# Patient Record
Sex: MALE | Race: WHITE | URBAN - METROPOLITAN AREA
[De-identification: names, ages, dates, MRNs, and addresses within clinical notes are randomized per-mention and may not be internally consistent; named-entity substitution may affect disease eponyms.]

---

## 2023-05-09 ENCOUNTER — OFFICE (OUTPATIENT)
Dept: URBAN - METROPOLITAN AREA CLINIC 102 | Facility: CLINIC | Age: 63
Setting detail: OPHTHALMOLOGY
End: 2023-05-09
Payer: COMMERCIAL

## 2023-05-09 DIAGNOSIS — H25.13: ICD-10-CM

## 2023-05-09 DIAGNOSIS — H43.813: ICD-10-CM

## 2023-05-09 PROCEDURE — 92014 COMPRE OPH EXAM EST PT 1/>: CPT | Performed by: OPHTHALMOLOGY

## 2023-05-09 ASSESSMENT — TONOMETRY
OS_IOP_MMHG: 18
OD_IOP_MMHG: 17

## 2023-05-09 ASSESSMENT — REFRACTION_MANIFEST
OS_AXIS: 090
OD_AXIS: 095
OD_VA1: 20/20
OS_ADD: +2.25
OD_SPHERE: -1.75
OD_ADD: +2.25
OS_SPHERE: -2.50
OD_CYLINDER: -1.00
OS_CYLINDER: -0.75
OS_VA1: 20/20

## 2023-05-09 ASSESSMENT — REFRACTION_CURRENTRX
OD_AXIS: 095
OD_ADD: +2.50
OS_VPRISM_DIRECTION: PROGS
OS_SPHERE: -2.75
OD_AXIS: 095
OD_VPRISM_DIRECTION: PROGS
OS_SPHERE: -2.50
OD_CYLINDER: -1.00
OD_CYLINDER: -0.50
OD_VPRISM_DIRECTION: PROGS
OS_OVR_VA: 20/
OS_OVR_VA: 20/
OS_CYLINDER: -0.75
OD_SPHERE: -1.75
OD_OVR_VA: 20/
OD_SPHERE: -2.25
OS_OVR_VA: 20/
OD_OVR_VA: 20/
OD_ADD: +2.50
OS_AXIS: 086
OS_AXIS: 090
OS_CYLINDER: -0.75
OS_AXIS: 090
OS_ADD: +2.50
OD_AXIS: 090
OS_CYLINDER: -0.50
OD_OVR_VA: 20/
OS_VPRISM_DIRECTION: PROGS
OD_ADD: +2.25
OD_CYLINDER: -0.75
OS_ADD: +2.25
OS_ADD: +2.50
OD_SPHERE: -2.00
OS_SPHERE: -2.50

## 2023-05-09 ASSESSMENT — KERATOMETRY
OS_AXISANGLE_DEGREES: 019
OS_K2POWER_DIOPTERS: 44.00
METHOD_AUTO_MANUAL: AUTO
OD_K2POWER_DIOPTERS: 43.75
OD_AXISANGLE_DEGREES: 164
OD_K1POWER_DIOPTERS: 42.50
OS_K1POWER_DIOPTERS: 43.25

## 2023-05-09 ASSESSMENT — VISUAL ACUITY
OD_BCVA: 20/20
OS_BCVA: 20/25

## 2023-05-09 ASSESSMENT — REFRACTION_AUTOREFRACTION
OD_CYLINDER: -1.25
OS_AXIS: 081
OS_CYLINDER: -0.50
OS_SPHERE: -2.25
OD_SPHERE: --1.25
OD_AXIS: 089

## 2023-05-09 ASSESSMENT — SPHEQUIV_DERIVED
OS_SPHEQUIV: -2.5
OS_SPHEQUIV: -2.875
OD_SPHEQUIV: -2.25

## 2023-05-09 ASSESSMENT — CONFRONTATIONAL VISUAL FIELD TEST (CVF)
OS_FINDINGS: FULL
OD_FINDINGS: FULL

## 2023-05-09 ASSESSMENT — AXIALLENGTH_DERIVED
OD_AL: 24.6518
OS_AL: 24.558
OS_AL: 24.7173

## 2023-07-07 PROBLEM — Z00.00 ENCOUNTER FOR PREVENTIVE HEALTH EXAMINATION: Status: ACTIVE | Noted: 2023-07-07

## 2023-07-18 ENCOUNTER — APPOINTMENT (OUTPATIENT)
Dept: DERMATOLOGY | Facility: CLINIC | Age: 63
End: 2023-07-18

## 2023-09-25 ENCOUNTER — APPOINTMENT (OUTPATIENT)
Dept: UROLOGY | Facility: CLINIC | Age: 63
End: 2023-09-25

## 2023-10-10 ENCOUNTER — APPOINTMENT (OUTPATIENT)
Dept: OTOLARYNGOLOGY | Facility: CLINIC | Age: 63
End: 2023-10-10
Payer: COMMERCIAL

## 2023-10-10 VITALS
DIASTOLIC BLOOD PRESSURE: 79 MMHG | HEIGHT: 72 IN | BODY MASS INDEX: 25.19 KG/M2 | SYSTOLIC BLOOD PRESSURE: 123 MMHG | OXYGEN SATURATION: 95 % | WEIGHT: 186 LBS | HEART RATE: 63 BPM

## 2023-10-10 DIAGNOSIS — H93.8X3 OTHER SPECIFIED DISORDERS OF EAR, BILATERAL: ICD-10-CM

## 2023-10-10 PROCEDURE — 99204 OFFICE O/P NEW MOD 45 MIN: CPT | Mod: 25

## 2023-10-10 PROCEDURE — 31231 NASAL ENDOSCOPY DX: CPT

## 2023-10-10 RX ORDER — LORATADINE 5 MG/5 ML
0.05 SOLUTION, ORAL ORAL
Qty: 1 | Refills: 0 | Status: ACTIVE | COMMUNITY
Start: 2023-10-10 | End: 1900-01-01

## 2023-10-10 RX ORDER — METHYLPREDNISOLONE 4 MG/1
4 TABLET ORAL
Qty: 1 | Refills: 0 | Status: ACTIVE | COMMUNITY
Start: 2023-10-10 | End: 1900-01-01

## 2023-10-10 RX ORDER — AMOXICILLIN AND CLAVULANATE POTASSIUM 875; 125 MG/1; MG/1
875-125 TABLET, COATED ORAL
Qty: 20 | Refills: 0 | Status: ACTIVE | COMMUNITY
Start: 2023-10-10 | End: 1900-01-01

## 2023-10-12 ENCOUNTER — APPOINTMENT (OUTPATIENT)
Dept: INTERNAL MEDICINE | Facility: CLINIC | Age: 63
End: 2023-10-12

## 2023-11-16 ENCOUNTER — APPOINTMENT (OUTPATIENT)
Dept: OTOLARYNGOLOGY | Facility: CLINIC | Age: 63
End: 2023-11-16
Payer: COMMERCIAL

## 2023-11-16 VITALS
SYSTOLIC BLOOD PRESSURE: 115 MMHG | HEIGHT: 72 IN | HEART RATE: 65 BPM | BODY MASS INDEX: 25.19 KG/M2 | WEIGHT: 186 LBS | DIASTOLIC BLOOD PRESSURE: 68 MMHG | TEMPERATURE: 96.6 F

## 2023-11-16 DIAGNOSIS — K21.9 GASTRO-ESOPHAGEAL REFLUX DISEASE W/OUT ESOPHAGITIS: ICD-10-CM

## 2023-11-16 PROCEDURE — 31231 NASAL ENDOSCOPY DX: CPT

## 2023-11-16 PROCEDURE — 99214 OFFICE O/P EST MOD 30 MIN: CPT | Mod: 25

## 2024-01-26 ENCOUNTER — APPOINTMENT (OUTPATIENT)
Dept: CT IMAGING | Facility: CLINIC | Age: 64
End: 2024-01-26
Payer: COMMERCIAL

## 2024-01-26 ENCOUNTER — OUTPATIENT (OUTPATIENT)
Dept: OUTPATIENT SERVICES | Facility: HOSPITAL | Age: 64
LOS: 1 days | End: 2024-01-26
Payer: COMMERCIAL

## 2024-01-26 DIAGNOSIS — R09.81 NASAL CONGESTION: ICD-10-CM

## 2024-01-26 PROCEDURE — 70486 CT MAXILLOFACIAL W/O DYE: CPT | Mod: 26

## 2024-01-26 PROCEDURE — 70486 CT MAXILLOFACIAL W/O DYE: CPT

## 2024-01-29 ENCOUNTER — NON-APPOINTMENT (OUTPATIENT)
Age: 64
End: 2024-01-29

## 2024-02-05 ENCOUNTER — NON-APPOINTMENT (OUTPATIENT)
Age: 64
End: 2024-02-05

## 2024-02-12 ENCOUNTER — APPOINTMENT (OUTPATIENT)
Dept: OTOLARYNGOLOGY | Facility: CLINIC | Age: 64
End: 2024-02-12
Payer: COMMERCIAL

## 2024-02-12 VITALS
DIASTOLIC BLOOD PRESSURE: 75 MMHG | BODY MASS INDEX: 25.19 KG/M2 | HEART RATE: 68 BPM | HEIGHT: 72 IN | SYSTOLIC BLOOD PRESSURE: 125 MMHG | WEIGHT: 186 LBS

## 2024-02-12 DIAGNOSIS — J34.89 OTHER SPECIFIED DISORDERS OF NOSE AND NASAL SINUSES: ICD-10-CM

## 2024-02-12 DIAGNOSIS — R09.81 NASAL CONGESTION: ICD-10-CM

## 2024-02-12 DIAGNOSIS — J32.4 CHRONIC PANSINUSITIS: ICD-10-CM

## 2024-02-12 PROCEDURE — 99214 OFFICE O/P EST MOD 30 MIN: CPT | Mod: 25

## 2024-02-12 PROCEDURE — 31231 NASAL ENDOSCOPY DX: CPT

## 2024-02-12 NOTE — PROCEDURE
[FreeTextEntry6] : Procedure performed: Nasal Endoscopy- Diagnostic Pre-op indication(s): nasal congestion Post-op indication(s): nasal congestion Verbal and/or written consent obtained from patient Anterior rhinoscopy insufficient to account for symptoms Scope #: 3, flexible fiber optic telescope The scope was introduced in the nasal passage between the middle and inferior turbinates to exam the inferior portion of the middle meatus and the fontanelle, as well as the maxillary ostia. Next, the scope was passed medically and posteriorly to the middle turbinates to examine the sphenoethmoid recess and the superior turbinate region.  Upon visualization the finders are as follows: Nasal Septum: sigmoidal septal deviation Bilateral - Mucosa: boggy turbinates, Mucous: scant, Polyp: not seen, Inferior Turbinate: boggy, Middle Turbinate: normal, Superior Turbinate: normal, Inferior Meatus: narrow, Middle Meatus: narrow, Super Meatus: normal, Sphenoethmoidal Recess: clear BITH [de-identified] : Procedure performed: laryngeal Endoscopy- Diagnostic Pre-op/post op indication: dysphonia Verbal and/or written consent obtained from patient, Patient was unable to cooperate with mirror Scope #: 3, flexible fiber optic telescope used  Scope was introduced through the nose passed on the floor of the nose to the nasopharynx and then followed down the soft palate to the lower pharynx. The tongue Base, Larynx, Hypopharynx were examined. Base of tongue was symmetric, vallecular was clear, epiglottis was not deformed, subglottis/ pyriform and posterior pharyngeal walls were clear. No erythema, edema, pooling of secretions, masses or lesions. Airway patent, no foreign body visualized. No glottic/supraglottic edema. True vocal cords, arytenoids, vestibular folds, ventricles, pyriform sinuses, and aryepiglottic folds appear normal bilaterally. Vocal cords mobile with good contact b/l scar band at tonsillar pillar

## 2024-02-12 NOTE — PHYSICAL EXAM
[Nasal Endoscopy Performed] : nasal endoscopy was performed, see procedure section for findings [Midline] : trachea located in midline position [Normal] : no abnormal secretions [de-identified] : Left sided deflection of lower 1/3 of nose [de-identified] : scarring at tonsillar pillars

## 2024-02-12 NOTE — ASSESSMENT
[FreeTextEntry1] : 63 year old male presents with chronic sinusitis. On exam, sigmoidal septal deviation, BITH, and narrow internal valve worse on the left. Left sided deflection of lower 1/3 of nose.  CT scan reviewed from [facility] 2020 indicates mucosal thickening at maxillary, ethmoid and frontal sinuses as well as significant sigmoidal septal deviation.  CT scan 01/24 reviewed: Chronic inflammatory changes throughout the paranasal sinuses, has worsened thickening compared to 2020 scan   Discussed options:  1) continue conservative management with nasal sprays, allergy avoidance  2) additional course of antibiotics and steroids with decongestants 3) office balloon, possible septo possible turbs  4) OR procedure Sinuses, septoplasty, turbinate reduction and nasal valve repair   - continue Flonase and irrigation  - pt elected for option 4, will book   Risks benefits and alternatives to septonasal reconstruction and bilateral inferior turbinate reduction discussed. Risks of bleeding, infection, septal hematoma, injury to the skull base, septal perforation results in whistling, permanent numbness in and around their nose, pain, discoloration or swelling that may persist, scarring crusting and bleeding as well as continued nasal obstruction, empty nose syndrome, cosmetic deformity, uneven-looking nose, collapse, scarring, synechiae, pollybeak deformity, rocker deformity, bone comminution, and need for revisionary surgery (patient explained that there is inherent revision rate to septorhinoplasty), osteitis, bleeding, infection, recurrent or persistent nasal deformity. Patient understood risks and would like to continue with the operation. Risks benefits and alternatives of endoscopic sinus surgery with possible image guidance possible septoplasty bilateral inferior turbinate reduction discussed with patient at length. Risks discussed include but were not limited to bleeding, infection, persistent symptoms, empty nose syndrome, scarring, injury to the skull base and brain and CSF leak, injury to orbit, crusting, septal hematoma, septal perforation results in whistling, crusting and bleeding as well as continued nasal obstruction etc.  were discussed. For polyps, discussed very high recurrence rate that require future surveillance, maintenance and likelihood of need for further procedures. Patient verbalized understanding of risks and benefits and also asked probing follow up questions which were answered to clarify details and get full understanding.  Offered option of cosmetic reconstruction. patient did not want to pay extra for the cosmetic piece and the concern is breathing - b/l  more on left, possible battens, possible columellar strut, caudal resection & swing   Patient also with globus sensation. On exam, scarring at tonsillar pillars suggestive of previous tonsillectomy, erythema and edema consistent with moderate reflux. scar band at tonsillar pillar.  - continue lifestyle regiment to reduce overproduction of acid and reduce laryngeal reflux including avoiding caffeine, alcohol, eating before bed, peppermint, spicy and fatty foods, and head elevation at night etc. Handout detailing regiment also given.

## 2024-02-12 NOTE — HISTORY OF PRESENT ILLNESS
complains of pain/discomfort [de-identified] : 63 year old male with chronic sinusitis presents for evaluation of nasal congestoin, sinus pressure and post nasal drip for many years. States he has a constant stuffy nose. Uses nasal saline which helps temporarily.  States he never got sinus infections until a few years ago, now has gotten 2 in the last 12 months. Tried flonase which helped a little. Tried montelukast but states didnt feel any different. Patient has seasonal allergies and will take allegra as needed.  uses breath right strip and nasal cone at times which helps. Denies sinus pressure between infections that he notices.   States has to clear throat all the time and feels the post nasal drip in the back of his throat. Denies acid reflux treatment.  Denies difficulty swallowing or choking sensation.   Also with pressure at ears, right more than left for many years.   Patient following up s/p sinusitis regiment which helped for about 1-2 weeks, then came down with a cold. Now with pressure at forehead and congestion. Went to urgent care monday. Also got canker sores in his mouth for which he started zinc and lysine. Using netipot, states it works sometimes better than others. Has been feeling like post nasal drip has been worse, also has had scratchy throat.  ear pressure has resolved  [FreeTextEntry1] : Patient following up s/p sinusitis regiment. Here to review CT scan. With pressure at forehead and congestion. Using netipot, states it works sometimes better than others.

## 2024-02-12 NOTE — END OF VISIT
[FreeTextEntry3] : I personally saw and examined the patient in detail. I spoke to MYRA White regarding the assessment and plan of care.  I preformed the procedures and I reviewed the above assessment and plan of care, and agree. I have made changes in changes in the body of the note where appropriate.

## 2024-02-12 NOTE — CONSULT LETTER
[Please see my note below.] : Please see my note below. [FreeTextEntry1] : Dear Dr. DERIC JUSTICE  I had the pleasure of evaluating your patient BHAVIN GARCIA, thank you for allowing us to participate in their care. please see full note detailing our visit below. If you have any questions, please do not hesitate to call me and I would be happy to discuss further.   Steven Husain M.D. Attending Physician,   Department of Otolaryngology - Head and Neck Surgery [facility]  Office: [phone] Fax: [phone]

## 2024-05-13 ENCOUNTER — OFFICE (OUTPATIENT)
Dept: URBAN - METROPOLITAN AREA CLINIC 102 | Facility: CLINIC | Age: 64
Setting detail: OPHTHALMOLOGY
End: 2024-05-13

## 2024-05-13 DIAGNOSIS — H25.13: ICD-10-CM

## 2024-05-13 DIAGNOSIS — H31.001: ICD-10-CM

## 2024-05-13 DIAGNOSIS — H43.813: ICD-10-CM

## 2024-05-13 PROCEDURE — 92020 GONIOSCOPY: CPT | Performed by: OPHTHALMOLOGY

## 2024-05-13 PROCEDURE — 92014 COMPRE OPH EXAM EST PT 1/>: CPT | Performed by: OPHTHALMOLOGY

## 2024-05-13 ASSESSMENT — CONFRONTATIONAL VISUAL FIELD TEST (CVF)
OS_FINDINGS: FULL
OD_FINDINGS: FULL

## 2024-08-12 ENCOUNTER — NON-APPOINTMENT (OUTPATIENT)
Age: 64
End: 2024-08-12

## 2024-09-25 ENCOUNTER — OUTPATIENT (OUTPATIENT)
Dept: OUTPATIENT SERVICES | Facility: HOSPITAL | Age: 64
LOS: 1 days | End: 2024-09-25
Payer: COMMERCIAL

## 2024-09-25 VITALS
HEIGHT: 72 IN | SYSTOLIC BLOOD PRESSURE: 127 MMHG | RESPIRATION RATE: 18 BRPM | WEIGHT: 182.98 LBS | OXYGEN SATURATION: 100 % | HEART RATE: 71 BPM | DIASTOLIC BLOOD PRESSURE: 78 MMHG | TEMPERATURE: 99 F

## 2024-09-25 DIAGNOSIS — G93.2 BENIGN INTRACRANIAL HYPERTENSION: Chronic | ICD-10-CM

## 2024-09-25 DIAGNOSIS — Z90.89 ACQUIRED ABSENCE OF OTHER ORGANS: Chronic | ICD-10-CM

## 2024-09-25 DIAGNOSIS — Z98.890 OTHER SPECIFIED POSTPROCEDURAL STATES: Chronic | ICD-10-CM

## 2024-09-25 DIAGNOSIS — Z01.818 ENCOUNTER FOR OTHER PREPROCEDURAL EXAMINATION: ICD-10-CM

## 2024-09-25 DIAGNOSIS — J32.4 CHRONIC PANSINUSITIS: ICD-10-CM

## 2024-09-25 DIAGNOSIS — J34.89 OTHER SPECIFIED DISORDERS OF NOSE AND NASAL SINUSES: ICD-10-CM

## 2024-09-25 DIAGNOSIS — Z96.611 PRESENCE OF RIGHT ARTIFICIAL SHOULDER JOINT: Chronic | ICD-10-CM

## 2024-09-25 DIAGNOSIS — J34.2 DEVIATED NASAL SEPTUM: ICD-10-CM

## 2024-09-25 LAB
ANION GAP SERPL CALC-SCNC: 10 MMOL/L — SIGNIFICANT CHANGE UP (ref 5–17)
BUN SERPL-MCNC: 21 MG/DL — SIGNIFICANT CHANGE UP (ref 7–23)
CALCIUM SERPL-MCNC: 10.1 MG/DL — SIGNIFICANT CHANGE UP (ref 8.4–10.5)
CHLORIDE SERPL-SCNC: 103 MMOL/L — SIGNIFICANT CHANGE UP (ref 96–108)
CO2 SERPL-SCNC: 25 MMOL/L — SIGNIFICANT CHANGE UP (ref 22–31)
CREAT SERPL-MCNC: 0.93 MG/DL — SIGNIFICANT CHANGE UP (ref 0.5–1.3)
EGFR: 92 ML/MIN/1.73M2 — SIGNIFICANT CHANGE UP
GLUCOSE SERPL-MCNC: 96 MG/DL — SIGNIFICANT CHANGE UP (ref 70–99)
HCT VFR BLD CALC: 44.7 % — SIGNIFICANT CHANGE UP (ref 39–50)
HGB BLD-MCNC: 14.5 G/DL — SIGNIFICANT CHANGE UP (ref 13–17)
MCHC RBC-ENTMCNC: 29.8 PG — SIGNIFICANT CHANGE UP (ref 27–34)
MCHC RBC-ENTMCNC: 32.4 GM/DL — SIGNIFICANT CHANGE UP (ref 32–36)
MCV RBC AUTO: 92 FL — SIGNIFICANT CHANGE UP (ref 80–100)
NRBC # BLD: 0 /100 WBCS — SIGNIFICANT CHANGE UP (ref 0–0)
PLATELET # BLD AUTO: 255 K/UL — SIGNIFICANT CHANGE UP (ref 150–400)
POTASSIUM SERPL-MCNC: 4.1 MMOL/L — SIGNIFICANT CHANGE UP (ref 3.5–5.3)
POTASSIUM SERPL-SCNC: 4.1 MMOL/L — SIGNIFICANT CHANGE UP (ref 3.5–5.3)
RBC # BLD: 4.86 M/UL — SIGNIFICANT CHANGE UP (ref 4.2–5.8)
RBC # FLD: 13.5 % — SIGNIFICANT CHANGE UP (ref 10.3–14.5)
SODIUM SERPL-SCNC: 138 MMOL/L — SIGNIFICANT CHANGE UP (ref 135–145)
WBC # BLD: 5.61 K/UL — SIGNIFICANT CHANGE UP (ref 3.8–10.5)
WBC # FLD AUTO: 5.61 K/UL — SIGNIFICANT CHANGE UP (ref 3.8–10.5)

## 2024-09-25 PROCEDURE — 80048 BASIC METABOLIC PNL TOTAL CA: CPT

## 2024-09-25 PROCEDURE — 36415 COLL VENOUS BLD VENIPUNCTURE: CPT

## 2024-09-25 PROCEDURE — 85027 COMPLETE CBC AUTOMATED: CPT

## 2024-09-25 PROCEDURE — G0463: CPT

## 2024-09-25 RX ORDER — SODIUM CHLORIDE IRRIG SOLUTION 0.9 %
1000 SOLUTION, IRRIGATION IRRIGATION
Refills: 0 | Status: DISCONTINUED | OUTPATIENT
Start: 2024-10-16 | End: 2024-10-30

## 2024-09-25 RX ORDER — SODIUM CHLORIDE 0.9 % (FLUSH) 0.9 %
3 SYRINGE (ML) INJECTION EVERY 8 HOURS
Refills: 0 | Status: DISCONTINUED | OUTPATIENT
Start: 2024-10-16 | End: 2024-10-30

## 2024-09-25 NOTE — H&P PST ADULT - HISTORY OF PRESENT ILLNESS
63 y/o Male with PMHx significant for HTN, BPH, idiopathic intracranial hypertension (pt reports this is resolved), s/p craniotomy (1973) who reports a several year history of chronic sinusitis associated with congestion and sinus pressure  65 y/o Male with PMHx significant for HTN, BPH, idiopathic intracranial hypertension (pt reports this is resolved), s/p craniotomy (1973), DDD, s/p cervical discectomy, s/p Right Total Shoulder Replacement  who reports a several year history of chronic sinusitis associated with congestion, sinus pressure, and post nasal drip. He is now scheduled for Septoplasty, Bilateral Inferior Turbinoplasty, Nasal Valve Repair with Graft, Functional Endoscopic Sinus Surgery with [facility] on 10/16/24. He denies CP, palps, SOB, syncope, fever or chills.  63 y/o Male with PMHx significant for HTN, BPH, idiopathic intracranial hypertension (pt reports this is resolved), s/p craniotomy (1973), Degenerative Cervical disc, s/p cervical discectomy, s/p Right Total Shoulder Replacement  who reports a several year history of chronic sinusitis associated with congestion, sinus pressure, and post nasal drip. He is now scheduled for Septoplasty, Bilateral Inferior Turbinoplasty, Nasal Valve Repair with Graft, Functional Endoscopic Sinus Surgery with [facility] on 10/16/24. He denies CP, palps, SOB, syncope, fever or chills.

## 2024-09-25 NOTE — H&P PST ADULT - PROBLEM SELECTOR PLAN 1
Septoplasty, Bilateral Inferior Turbinoplasty, Nasal Valve Repair with Graft, Functional Endoscopic Sinus Surgery with [facility] on 10/16/24.

## 2024-09-25 NOTE — H&P PST ADULT - NSICDXPASTSURGICALHX_GEN_ALL_CORE_FT
PAST SURGICAL HISTORY:  H/O cervical discectomy     H/O total shoulder replacement, right     H/O umbilical hernia repair     S/P craniotomy     S/P tonsillectomy     Status post bilateral hernia repair

## 2024-09-25 NOTE — H&P PST ADULT - NSICDXPASTMEDICALHX_GEN_ALL_CORE_FT
PAST MEDICAL HISTORY:  BPH (benign prostatic hyperplasia)     HTN (hypertension)     Idiopathic intracranial hypertension      PAST MEDICAL HISTORY:  BPH (benign prostatic hyperplasia)     Chronic pansinusitis     HTN (hypertension)     Idiopathic intracranial hypertension      PAST MEDICAL HISTORY:  BPH (benign prostatic hyperplasia)     Chronic pansinusitis     Degenerative cervical disc     HTN (hypertension)     Idiopathic intracranial hypertension

## 2024-09-25 NOTE — H&P PST ADULT - NS MD HP INPLANTS MED DEV
cervical screws and plate, R Total shoulder replacement/Artificial joint R Total shoulder replacement, cervical screws and plate/Artificial joint

## 2024-09-25 NOTE — H&P PST ADULT - ENT GEN HX ROS MEA POS PC
vertigo/sinus symptoms/nasal congestion/nasal obstruction vertigo/sinus symptoms/nasal congestion/post-nasal discharge

## 2024-09-25 NOTE — H&P PST ADULT - ASSESSMENT
DASI score:  DASI activity: 20 mins elliptical and weights 4x/wk, walks 1 hour once a week, carry groceries, golf  Loose teeth or denture: no    MP  DASI score: 7.44 mets   DASI activity: 20 mins elliptical and weights 4x/wk, walks 1 hour once a week, carry groceries, golf  Loose teeth or denture: no    MP 2

## 2024-10-10 ENCOUNTER — APPOINTMENT (OUTPATIENT)
Dept: OTOLARYNGOLOGY | Facility: CLINIC | Age: 64
End: 2024-10-10
Payer: COMMERCIAL

## 2024-10-10 VITALS
HEIGHT: 72 IN | BODY MASS INDEX: 24.79 KG/M2 | DIASTOLIC BLOOD PRESSURE: 77 MMHG | SYSTOLIC BLOOD PRESSURE: 139 MMHG | HEART RATE: 86 BPM | WEIGHT: 183 LBS | TEMPERATURE: 97.7 F

## 2024-10-10 DIAGNOSIS — J34.89 OTHER SPECIFIED DISORDERS OF NOSE AND NASAL SINUSES: ICD-10-CM

## 2024-10-10 DIAGNOSIS — R09.81 NASAL CONGESTION: ICD-10-CM

## 2024-10-10 DIAGNOSIS — J32.4 CHRONIC PANSINUSITIS: ICD-10-CM

## 2024-10-10 PROCEDURE — 99214 OFFICE O/P EST MOD 30 MIN: CPT | Mod: 25

## 2024-10-10 PROCEDURE — 31231 NASAL ENDOSCOPY DX: CPT

## 2024-10-10 RX ORDER — OXYCODONE AND ACETAMINOPHEN 5; 325 MG/1; MG/1
5-325 TABLET ORAL
Qty: 10 | Refills: 0 | Status: ACTIVE | COMMUNITY
Start: 2024-10-10 | End: 1900-01-01

## 2024-10-10 RX ORDER — PREDNISONE 10 MG/1
10 TABLET ORAL
Qty: 27 | Refills: 0 | Status: ACTIVE | COMMUNITY
Start: 2024-10-10 | End: 1900-01-01

## 2024-10-10 RX ORDER — AMOXICILLIN AND CLAVULANATE POTASSIUM 875; 125 MG/1; MG/1
875-125 TABLET, COATED ORAL
Qty: 20 | Refills: 0 | Status: ACTIVE | COMMUNITY
Start: 2024-10-10 | End: 1900-01-01

## 2024-10-16 ENCOUNTER — RESULT REVIEW (OUTPATIENT)
Age: 64
End: 2024-10-16

## 2024-10-16 ENCOUNTER — OUTPATIENT (OUTPATIENT)
Dept: OUTPATIENT SERVICES | Facility: HOSPITAL | Age: 64
LOS: 1 days | End: 2024-10-16
Payer: COMMERCIAL

## 2024-10-16 ENCOUNTER — APPOINTMENT (OUTPATIENT)
Dept: OTOLARYNGOLOGY | Facility: HOSPITAL | Age: 64
End: 2024-10-16

## 2024-10-16 ENCOUNTER — TRANSCRIPTION ENCOUNTER (OUTPATIENT)
Age: 64
End: 2024-10-16

## 2024-10-16 VITALS
TEMPERATURE: 97 F | RESPIRATION RATE: 15 BRPM | HEIGHT: 72 IN | SYSTOLIC BLOOD PRESSURE: 128 MMHG | WEIGHT: 182.98 LBS | OXYGEN SATURATION: 100 % | DIASTOLIC BLOOD PRESSURE: 68 MMHG | HEART RATE: 68 BPM

## 2024-10-16 VITALS
OXYGEN SATURATION: 99 % | HEART RATE: 66 BPM | SYSTOLIC BLOOD PRESSURE: 125 MMHG | RESPIRATION RATE: 16 BRPM | DIASTOLIC BLOOD PRESSURE: 69 MMHG

## 2024-10-16 DIAGNOSIS — J34.89 OTHER SPECIFIED DISORDERS OF NOSE AND NASAL SINUSES: ICD-10-CM

## 2024-10-16 DIAGNOSIS — J34.2 DEVIATED NASAL SEPTUM: ICD-10-CM

## 2024-10-16 DIAGNOSIS — Z98.890 OTHER SPECIFIED POSTPROCEDURAL STATES: Chronic | ICD-10-CM

## 2024-10-16 DIAGNOSIS — J32.4 CHRONIC PANSINUSITIS: ICD-10-CM

## 2024-10-16 DIAGNOSIS — Z90.89 ACQUIRED ABSENCE OF OTHER ORGANS: Chronic | ICD-10-CM

## 2024-10-16 DIAGNOSIS — Z96.611 PRESENCE OF RIGHT ARTIFICIAL SHOULDER JOINT: Chronic | ICD-10-CM

## 2024-10-16 PROCEDURE — 31267 ENDOSCOPY MAXILLARY SINUS: CPT | Mod: 50

## 2024-10-16 PROCEDURE — 30520 REPAIR OF NASAL SEPTUM: CPT

## 2024-10-16 PROCEDURE — 88311 DECALCIFY TISSUE: CPT

## 2024-10-16 PROCEDURE — 30140 RESECT INFERIOR TURBINATE: CPT | Mod: 50

## 2024-10-16 PROCEDURE — 31288 NASAL/SINUS ENDOSCOPY SURG: CPT | Mod: 50

## 2024-10-16 PROCEDURE — 88300 SURGICAL PATH GROSS: CPT

## 2024-10-16 PROCEDURE — 88305 TISSUE EXAM BY PATHOLOGIST: CPT

## 2024-10-16 PROCEDURE — 61782 SCAN PROC CRANIAL EXTRA: CPT

## 2024-10-16 PROCEDURE — C9399: CPT

## 2024-10-16 PROCEDURE — 88305 TISSUE EXAM BY PATHOLOGIST: CPT | Mod: 26

## 2024-10-16 PROCEDURE — 31253 NSL/SINS NDSC TOTAL: CPT | Mod: 50

## 2024-10-16 PROCEDURE — 30465 REPAIR NASAL STENOSIS: CPT

## 2024-10-16 PROCEDURE — 20912 REMOVE CARTILAGE FOR GRAFT: CPT

## 2024-10-16 PROCEDURE — 88311 DECALCIFY TISSUE: CPT | Mod: 26

## 2024-10-16 PROCEDURE — 30999 UNLISTED PROCEDURE NOSE: CPT

## 2024-10-16 PROCEDURE — 88300 SURGICAL PATH GROSS: CPT | Mod: 26

## 2024-10-16 RX ORDER — ALFUZOSIN HYDROCHLORIDE 10 MG/1
1 TABLET ORAL
Refills: 0 | DISCHARGE

## 2024-10-16 RX ORDER — ONDANSETRON HCL/PF 4 MG/2 ML
4 VIAL (ML) INJECTION ONCE
Refills: 0 | Status: COMPLETED | OUTPATIENT
Start: 2024-10-16 | End: 2024-10-16

## 2024-10-16 RX ORDER — LIDOCAINE HCL 20 MG/ML
0.2 AMPUL (ML) INJECTION ONCE
Refills: 0 | Status: COMPLETED | OUTPATIENT
Start: 2024-10-16 | End: 2024-10-16

## 2024-10-16 RX ORDER — ALPRAZOLAM 0.5 MG/1
1 TABLET ORAL
Refills: 0 | DISCHARGE

## 2024-10-16 RX ORDER — OXYCODONE HYDROCHLORIDE 30 MG/1
5 TABLET, FILM COATED, EXTENDED RELEASE ORAL ONCE
Refills: 0 | Status: DISCONTINUED | OUTPATIENT
Start: 2024-10-16 | End: 2024-10-16

## 2024-10-16 RX ORDER — DIPHENHYDRAMINE HCL 12.5MG/5ML
12.5 LIQUID (ML) ORAL ONCE
Refills: 0 | Status: DISCONTINUED | OUTPATIENT
Start: 2024-10-16 | End: 2024-10-30

## 2024-10-16 RX ORDER — FENTANYL CITRATE-0.9 % NACL/PF 300MCG/30
50 PATIENT CONTROLLED ANALGESIA VIAL INJECTION
Refills: 0 | Status: DISCONTINUED | OUTPATIENT
Start: 2024-10-16 | End: 2024-10-16

## 2024-10-16 RX ADMIN — Medication 4 MILLIGRAM(S): at 11:37

## 2024-10-16 RX ADMIN — Medication 100 MILLILITER(S): at 06:29

## 2024-10-16 RX ADMIN — Medication 3 MILLILITER(S): at 06:28

## 2024-10-16 NOTE — ASU DISCHARGE PLAN (ADULT/PEDIATRIC) - FINANCIAL ASSISTANCE
[facility] provides services at a reduced cost to those who are determined to be eligible through [facility]’s financial assistance program. Information regarding [facility]’s financial assistance program can be found by going to [email or web address] or by calling [phone].

## 2024-10-16 NOTE — ASU PREOP CHECKLIST - WAS PATIENT ON BETA BLOCKER?
No .  Erinn Tomlinson MD  Division of Hospital Medicine  [facility]   Available on Microsoft Teams - messages preferred prior to calls.    Medically stable for discharge home today 7/15/22 with home PT and home O2.  He will with follow-up with pulmonologist Dr. Modi on Mon 7/18/22 (has appointment) and GI motility specialist as outpatient.  All necessary follow-up and changes discussed with granddaclarissa Hill last night.  Discharge planning time spent: 38 minutes.    Plan discussed with patient, granddaughter Liz via phone 7/14 evening, and medicine NP Danielle.

## 2024-10-16 NOTE — PRE-ANESTHESIA EVALUATION ADULT - NS MD HP INPLANTS MED DEV
LOV 1/30/2024  NOV 8/8/2024  Last Labs 1/30/2024  
R Total shoulder replacement, cervical screws and plate/Artificial joint

## 2024-10-16 NOTE — PRE-ANESTHESIA EVALUATION ADULT - NSANTHPMHFT_GEN_ALL_CORE
65 y/o Male with PMHx significant for HTN, BPH, idiopathic intracranial hypertension (pt reports this is resolved), s/p craniotomy (1973), Degenerative Cervical disc, s/p cervical discectomy, s/p Right Total Shoulder Replacement  who reports a several year history of chronic sinusitis associated with congestion, sinus pressure, and post nasal drip. He is now scheduled for Septoplasty, Bilateral Inferior Turbinoplasty, Nasal Valve Repair with Graft, Functional Endoscopic Sinus Surgery with [facility] on 10/16/24. He denies CP, palps, SOB, syncope, fever or chills.

## 2024-10-16 NOTE — BRIEF OPERATIVE NOTE - NSICDXBRIEFPROCEDURE_GEN_ALL_CORE_FT
PROCEDURES:  Pansinusectomy 16-Oct-2024 10:09:13  Steven Husain  Reconstruction, nasal septum, with turbinate resection and nasal valve reconstruction using  graft 16-Oct-2024 10:09:23  Steven Husain

## 2024-10-16 NOTE — ASU DISCHARGE PLAN (ADULT/PEDIATRIC) - CARE COORDINATION DISCHARGE PLANNING
Please let pt know her mri did not show a diverticulum. However bc of her abnormal exam I would like to proceed with a cystoscopy. plase find out if feb 18th or Monday feb 25th works ok for her. I will  Place orders. Please call asc with which date she chooses. Also let her know there was yeast seen in urine sample and I will send her in antifungal     Mri pelvis 1/22/19  The very distal most portion of the urethra is incompletely image.  As visualized no urethral diverticulum is seen and particularly proximally no urethral diverticulum is seen.  The urinary bladder is unremarkable appearance.  No free fluid is seen within the pelvis.  There is appearance somewhat arcuate appearance of the uterus No

## 2024-10-16 NOTE — ASU PATIENT PROFILE, ADULT - NSICDXPASTMEDICALHX_GEN_ALL_CORE_FT
PAST MEDICAL HISTORY:  BPH (benign prostatic hyperplasia)     Chronic pansinusitis     Degenerative cervical disc     HTN (hypertension)     Idiopathic intracranial hypertension

## 2024-10-16 NOTE — BRIEF OPERATIVE NOTE - NSICDXBRIEFPREOP_GEN_ALL_CORE_FT
PRE-OP DIAGNOSIS:  Pansinusitis 16-Oct-2024 10:09:39  Steevn Husain  Nasal septal deviation 16-Oct-2024 10:09:53  Steven Husain  Nasal turbinate hypertrophy 16-Oct-2024 10:10:13  Steven Husain  Nasal valve stenosis 16-Oct-2024 10:10:37  Steven Husain

## 2024-10-16 NOTE — ASU DISCHARGE PLAN (ADULT/PEDIATRIC) - ASU DC SPECIAL INSTRUCTIONSFT
Nasal reconstruction instructions:  Complete antibiotics and steroids as directed on prescription  Pain medication as needed - do not drive, make decisions or operate machinery on pain meds. if constipates take stool softener, do not strain.     Avoid activities that may injure your nose: Right after your surgery, your nose is just starting to heal and can be damaged in many ways. Doing the following will help prevent your nose from getting injured:  •Do not allow anyone to accidently bump your nose. This includes children, pets, and your bed partner.  •Do not blow your nose for at least two weeks after surgery  •Do not do any strenuous activities. Do not go swimming for one month after your surgery.  •Do not wear clothing that you have to pull on over your head.  • Please walk around to keep blood circulating and reduce the risk of blood clots   Bathing and washing:   •Avoid touching or getting your nose dressing wet when washing your face.  •Be gentle with brushing your teeth and use a soft toothbrush.  •Have someone else wash your hair during the first week after your surgery.  •Take tub baths only and do not shower. Keep your dressing dry when bathing.    Limit your facial movements as much as possible:   •Avoid facial movements for one week. This includes grinning, laughing, and smiling.  •Avoid eating foods that need to be chewed for a long time.  •Avoid talking for a long time.    Wearing your contact lens or glasses:   •You can wear your contact lenses  •Do not wear your glasses or sunglasses where they rest on the top of your nose. A if needed you can use some tape to hang glasses off your forehead so the do not rest on your nose      •Nasal drainage: You may be sent home with a dressing under your nose. Change it when it gets wet and avoid touching your nose when doing so.  •Nasal rinsing:  Irrigate your nose cavity with saline (salt solution) 3 to 5 times each day. This should be done for at least two months after your surgery.   •Plaster cast and splint care: You may have a plaster cast or a splint on the outside of your nose. If so, it will be left on your nose for one week, will be removed in the office.  •Do not touch or disturb the cast or splint  •Keep the cast dry.    •Preventing swelling: Swelling of your nose and face is common right after your surgery. It can slow healing and increase your pain. The swelling may not go away for weeks or months after your surgery. Doing the following will help reduce your swelling:  ice pack under your eyes every 2 hours for the first 2 days after surgery if bones were broken and you have asplint.  •Avoid bending over.  •Rest and sleep with your head raised above your feet and body.  •Avoid letting your face get too warm. This includes sitting in the sun, and using sun lamps and hair dryers.        CONTACT A CAREGIVER IF:  •You are sick to your stomach or throw up.  •You have a fever or chills.  •You have increased eye discharge or eye redness, itchiness and swelling.  •perstent watery discharge from your nose   •You have questions or concerns about your condition, medicine, or care.    SEEK CARE IMMEDIATELY IF:  •You have trouble breathing all of a sudden.  •Difficulty with vision  •Your bandage becomes rapidly soaked with blood and the bleeding does not stop.  •Your incision is swollen, red, or has pus coming from it.  •Your stitches come apart. Nasal reconstruction instructions:  Complete antibiotics and steroids as directed on prescription  Pain medication as needed - do not drive, make decisions or operate machinery on pain meds. if constipates take stool softener, do not strain.     Avoid activities that may injure your nose: Right after your surgery, your nose is just starting to heal and can be damaged in many ways. Doing the following will help prevent your nose from getting injured:  •Do not allow anyone to accidently bump your nose. This includes children, pets, and your bed partner.  •Do not blow your nose for at least two weeks after surgery  •Do not do any strenuous activities. Do not go swimming for one month after your surgery.  •Do not wear clothing that you have to pull on over your head.  • Please walk around to keep blood circulating and reduce the risk of blood clots   Bathing and washing:   •Avoid touching or getting your nose dressing wet when washing your face.  •Be gentle with brushing your teeth and use a soft toothbrush.  •Have someone else wash your hair during the first week after your surgery.  •Take tub baths only and do not shower. Keep your dressing dry when bathing.    Limit your facial movements as much as possible:   •Avoid facial movements for one week. This includes grinning, laughing, and smiling.  •Avoid eating foods that need to be chewed for a long time.  •Avoid talking for a long time.    Wearing your contact lens or glasses:   •You can wear your contact lenses  •Do not wear your glasses or sunglasses where they rest on the top of your nose. A if needed you can use some tape to hang glasses off your forehead so the do not rest on your nose      •Nasal drainage: You may be sent home with a dressing under your nose. Change it when it gets wet and avoid touching your nose when doing so.  •Nasal rinsing:  Irrigate your nose cavity with saline (salt solution) 3 to 5 times each day. This should be done for at least two months after your surgery.   •Plaster cast and splint care: You may have a plaster cast or a splint on the outside of your nose. If so, it will be left on your nose for one week, will be removed in the office.  •Do not touch or disturb the cast or splint  •Keep the cast dry.    •Preventing swelling: Swelling of your nose and face is common right after your surgery. It can slow healing and increase your pain. The swelling may not go away for weeks or months after your surgery. Doing the following will help reduce your swelling:  ice pack under your eyes every 2 hours for the first 2 days after surgery if bones were broken and you have a splint.  •Avoid bending over.  •Rest and sleep with your head raised above your feet and body.  •Avoid letting your face get too warm. This includes sitting in the sun, and using sun lamps and hair dryers.        CONTACT A CAREGIVER IF:  •You are sick to your stomach or throw up.  •You have a fever or chills.  •You have increased eye discharge or eye redness, itchiness and swelling.  •persistent watery discharge from your nose   •You have questions or concerns about your condition, medicine, or care.    SEEK CARE IMMEDIATELY IF:  •You have trouble breathing all of a sudden.  •Difficulty with vision  •Your bandage becomes rapidly soaked with blood and the bleeding does not stop.  •Your incision is swollen, red, or has pus coming from it.  •Your stitches come apart.

## 2024-10-16 NOTE — PRE-ANESTHESIA EVALUATION ADULT - NSPROPOSEDPROCEDFT_GEN_ALL_CORE
Septoplasty, Bilateral Inferior Turbinoplasty, Nasal Valve Repair with Graft, Functional Endoscopic Sinus Surgery

## 2024-10-16 NOTE — ASU DISCHARGE PLAN (ADULT/PEDIATRIC) - NS MD DC FALL RISK RISK
For information on Fall & Injury Prevention, visit: [email or web address] OR  [email or web address] OR  https://www.cdc.gov/steadi/patient.html

## 2024-10-16 NOTE — BRIEF OPERATIVE NOTE - OPERATION/FINDINGS
severe scarring in the anterior nose with a twisted caudal septum and left external valve collapse  significant sinus disease b/l

## 2024-10-16 NOTE — ASU PREOP CHECKLIST - TAMPON REMOVED
[de-identified] : 31 y/o Male presents for CP Medical hx includes -flat feet, has orthotics -IBS=manages dietarily -hemorrhoids=asymptomatic -history of GERD, no longer an issue -h/o covid -has wisdom teeth removed  Denies CP/palps/SOB, bowels and urine are normal -continues on no daily meds -got covid vax X2 - with baby #1 due in may, Girl :) 
n/a

## 2024-10-17 ENCOUNTER — NON-APPOINTMENT (OUTPATIENT)
Age: 64
End: 2024-10-17

## 2024-10-17 RX ORDER — DOXYCYCLINE 100 MG/1
100 CAPSULE ORAL TWICE DAILY
Qty: 28 | Refills: 0 | Status: ACTIVE | COMMUNITY
Start: 2024-10-17 | End: 1900-01-01

## 2024-10-23 LAB — SURGICAL PATHOLOGY STUDY: SIGNIFICANT CHANGE UP

## 2024-10-24 ENCOUNTER — NON-APPOINTMENT (OUTPATIENT)
Age: 64
End: 2024-10-24

## 2024-10-24 ENCOUNTER — APPOINTMENT (OUTPATIENT)
Dept: OTOLARYNGOLOGY | Facility: CLINIC | Age: 64
End: 2024-10-24
Payer: COMMERCIAL

## 2024-10-24 VITALS — SYSTOLIC BLOOD PRESSURE: 134 MMHG | HEART RATE: 77 BPM | DIASTOLIC BLOOD PRESSURE: 80 MMHG | TEMPERATURE: 98 F

## 2024-10-24 DIAGNOSIS — R09.81 NASAL CONGESTION: ICD-10-CM

## 2024-10-24 DIAGNOSIS — J34.89 OTHER SPECIFIED DISORDERS OF NOSE AND NASAL SINUSES: ICD-10-CM

## 2024-10-24 DIAGNOSIS — J32.4 CHRONIC PANSINUSITIS: ICD-10-CM

## 2024-10-24 PROCEDURE — 99024 POSTOP FOLLOW-UP VISIT: CPT

## 2024-10-24 PROCEDURE — 31237 NSL/SINS NDSC SURG BX POLYPC: CPT | Mod: 50,58

## 2024-11-04 PROBLEM — M50.30 OTHER CERVICAL DISC DEGENERATION, UNSPECIFIED CERVICAL REGION: Chronic | Status: ACTIVE | Noted: 2024-09-25

## 2024-11-04 PROBLEM — I10 ESSENTIAL (PRIMARY) HYPERTENSION: Chronic | Status: ACTIVE | Noted: 2024-09-25

## 2024-11-04 PROBLEM — N40.0 BENIGN PROSTATIC HYPERPLASIA WITHOUT LOWER URINARY TRACT SYMPTOMS: Chronic | Status: ACTIVE | Noted: 2024-09-25

## 2024-11-04 PROBLEM — G93.2 BENIGN INTRACRANIAL HYPERTENSION: Chronic | Status: ACTIVE | Noted: 2024-09-25

## 2024-11-04 PROBLEM — J32.4 CHRONIC PANSINUSITIS: Chronic | Status: ACTIVE | Noted: 2024-09-25

## 2024-11-06 ENCOUNTER — APPOINTMENT (OUTPATIENT)
Dept: OTOLARYNGOLOGY | Facility: CLINIC | Age: 64
End: 2024-11-06
Payer: COMMERCIAL

## 2024-11-06 VITALS
BODY MASS INDEX: 24.38 KG/M2 | DIASTOLIC BLOOD PRESSURE: 72 MMHG | HEART RATE: 73 BPM | SYSTOLIC BLOOD PRESSURE: 122 MMHG | WEIGHT: 180 LBS | HEIGHT: 72 IN

## 2024-11-06 DIAGNOSIS — J32.4 CHRONIC PANSINUSITIS: ICD-10-CM

## 2024-11-06 DIAGNOSIS — R09.81 NASAL CONGESTION: ICD-10-CM

## 2024-11-06 DIAGNOSIS — J34.89 OTHER SPECIFIED DISORDERS OF NOSE AND NASAL SINUSES: ICD-10-CM

## 2024-11-06 PROCEDURE — 31237 NSL/SINS NDSC SURG BX POLYPC: CPT | Mod: 50,58

## 2024-11-06 PROCEDURE — 99024 POSTOP FOLLOW-UP VISIT: CPT

## 2024-11-07 RX ORDER — MUPIROCIN 20 MG/G
2 OINTMENT TOPICAL TWICE DAILY
Qty: 1 | Refills: 3 | Status: ACTIVE | COMMUNITY
Start: 2024-11-07 | End: 1900-01-01

## 2024-11-15 ENCOUNTER — APPOINTMENT (OUTPATIENT)
Dept: OTOLARYNGOLOGY | Facility: CLINIC | Age: 64
End: 2024-11-15

## 2024-11-25 ENCOUNTER — APPOINTMENT (OUTPATIENT)
Dept: OTOLARYNGOLOGY | Facility: CLINIC | Age: 64
End: 2024-11-25
Payer: COMMERCIAL

## 2024-11-25 DIAGNOSIS — R09.81 NASAL CONGESTION: ICD-10-CM

## 2024-11-25 DIAGNOSIS — J34.89 OTHER SPECIFIED DISORDERS OF NOSE AND NASAL SINUSES: ICD-10-CM

## 2024-11-25 DIAGNOSIS — J32.4 CHRONIC PANSINUSITIS: ICD-10-CM

## 2024-11-25 PROCEDURE — 95004 PERQ TESTS W/ALRGNC XTRCS: CPT

## 2024-11-25 PROCEDURE — 99024 POSTOP FOLLOW-UP VISIT: CPT

## 2024-11-25 PROCEDURE — 31237 NSL/SINS NDSC SURG BX POLYPC: CPT | Mod: 50,58

## 2025-03-21 ENCOUNTER — NON-APPOINTMENT (OUTPATIENT)
Age: 65
End: 2025-03-21

## 2025-03-21 ENCOUNTER — APPOINTMENT (OUTPATIENT)
Dept: OTOLARYNGOLOGY | Facility: CLINIC | Age: 65
End: 2025-03-21
Payer: COMMERCIAL

## 2025-03-21 VITALS
WEIGHT: 185 LBS | DIASTOLIC BLOOD PRESSURE: 74 MMHG | SYSTOLIC BLOOD PRESSURE: 114 MMHG | BODY MASS INDEX: 25.06 KG/M2 | HEIGHT: 72 IN | HEART RATE: 99 BPM

## 2025-03-21 DIAGNOSIS — J34.89 OTHER SPECIFIED DISORDERS OF NOSE AND NASAL SINUSES: ICD-10-CM

## 2025-03-21 DIAGNOSIS — R09.81 NASAL CONGESTION: ICD-10-CM

## 2025-03-21 DIAGNOSIS — J32.4 CHRONIC PANSINUSITIS: ICD-10-CM

## 2025-03-21 PROCEDURE — 99213 OFFICE O/P EST LOW 20 MIN: CPT | Mod: 25

## 2025-03-21 PROCEDURE — 31231 NASAL ENDOSCOPY DX: CPT

## 2025-09-16 ENCOUNTER — NON-APPOINTMENT (OUTPATIENT)
Age: 65
End: 2025-09-16

## 2025-09-19 ENCOUNTER — APPOINTMENT (OUTPATIENT)
Dept: OTOLARYNGOLOGY | Facility: CLINIC | Age: 65
End: 2025-09-19
Payer: COMMERCIAL

## 2025-09-19 VITALS
SYSTOLIC BLOOD PRESSURE: 108 MMHG | HEART RATE: 73 BPM | WEIGHT: 185 LBS | HEIGHT: 72 IN | DIASTOLIC BLOOD PRESSURE: 68 MMHG | BODY MASS INDEX: 25.06 KG/M2

## 2025-09-19 DIAGNOSIS — R09.81 NASAL CONGESTION: ICD-10-CM

## 2025-09-19 DIAGNOSIS — J34.89 OTHER SPECIFIED DISORDERS OF NOSE AND NASAL SINUSES: ICD-10-CM

## 2025-09-19 DIAGNOSIS — J32.4 CHRONIC PANSINUSITIS: ICD-10-CM

## 2025-09-19 PROCEDURE — 99213 OFFICE O/P EST LOW 20 MIN: CPT | Mod: 25

## 2025-09-19 PROCEDURE — 31231 NASAL ENDOSCOPY DX: CPT

## 2025-09-19 RX ORDER — LISINOPRIL 10 MG/1
10 TABLET ORAL
Refills: 0 | Status: ACTIVE | COMMUNITY

## (undated) DEVICE — ELCTR BOVIE SUCTION 11FR 8"

## (undated) DEVICE — DRAPE MAGNETIC INSTRUMENT MEDIUM

## (undated) DEVICE — BASIN AMBULATORY

## (undated) DEVICE — MEDTRONIC AXIEM PATIENT TRACKER NON-INVASIVE

## (undated) DEVICE — SOL IRR POUR NS 0.9% 500ML

## (undated) DEVICE — POSITIONER FOAM EGG CRATE ULNAR 2PCS (PINK)

## (undated) DEVICE — STAPLER SKIN VISI-STAT 35 WIDE

## (undated) DEVICE — SYR LUER LOK 10CC

## (undated) DEVICE — Device

## (undated) DEVICE — MEDTRONIC INSTRUMENT TRACKER ENT

## (undated) DEVICE — ELCTR BOVIE TIP NEEDLE 2.84"

## (undated) DEVICE — WARMING BLANKET LOWER ADULT

## (undated) DEVICE — SUT CHROMIC GUT 4-0 18" P-13

## (undated) DEVICE — SUT PDS II 4-0 18" P-3

## (undated) DEVICE — MEDICATION LABELS W MARKER

## (undated) DEVICE — BLADE SCALPEL SAFETYLOCK #15

## (undated) DEVICE — MARKING PEN W RULER

## (undated) DEVICE — GLV 7.5 PROTEXIS (WHITE)

## (undated) DEVICE — APPLICATOR Q TIP 6" WOOD STEM

## (undated) DEVICE — DRAPE INSTRUMENT POUCH 6.75" X 11"

## (undated) DEVICE — SUT PLAIN GUT 4-0 18" CT-1

## (undated) DEVICE — DRAPE MAYO STAND 30"

## (undated) DEVICE — MEDTRONIC PATIENT TRACKER ENT

## (undated) DEVICE — PACK NASAL

## (undated) DEVICE — DRSG NASOPORE 8CM STANDARD

## (undated) DEVICE — BLADE MEDTRONIC ENT SILVER BULLET ROTATABLE STRAIGHT 2.9MM X 11CM

## (undated) DEVICE — SOL ANTI FOG

## (undated) DEVICE — SPECIMEN CONTAINER 100ML

## (undated) DEVICE — BLADE MEDTRONIC ENT FUSION TRICUT ROTATABLE STRAIGHT 4MM X 13CM

## (undated) DEVICE — ELCTR BOVIE PENCIL HANDPIECE

## (undated) DEVICE — DRSG NASOPORE 8CM FIRM

## (undated) DEVICE — TUBING IRRIGATION STRAIGHT SHOT

## (undated) DEVICE — VENODYNE/SCD SLEEVE CALF MEDIUM

## (undated) DEVICE — DRSG SPLINT DYL II ARWY SILICONE

## (undated) DEVICE — SUT MONOSOF 3-0 30" C-13

## (undated) DEVICE — TUBING SUCTION 20FT

## (undated) DEVICE — BLADE SCALPEL SAFETYLOCK #11